# Patient Record
Sex: FEMALE | Race: WHITE | URBAN - METROPOLITAN AREA
[De-identification: names, ages, dates, MRNs, and addresses within clinical notes are randomized per-mention and may not be internally consistent; named-entity substitution may affect disease eponyms.]

---

## 2024-09-24 ENCOUNTER — APPOINTMENT (RX ONLY)
Dept: URBAN - METROPOLITAN AREA CLINIC 6 | Facility: CLINIC | Age: 74
Setting detail: DERMATOLOGY
End: 2024-09-24

## 2024-09-24 DIAGNOSIS — D18.0 HEMANGIOMA: ICD-10-CM

## 2024-09-24 DIAGNOSIS — D22 MELANOCYTIC NEVI: ICD-10-CM

## 2024-09-24 DIAGNOSIS — L82.1 OTHER SEBORRHEIC KERATOSIS: ICD-10-CM

## 2024-09-24 DIAGNOSIS — L81.4 OTHER MELANIN HYPERPIGMENTATION: ICD-10-CM

## 2024-09-24 DIAGNOSIS — Z71.89 OTHER SPECIFIED COUNSELING: ICD-10-CM

## 2024-09-24 PROBLEM — D18.01 HEMANGIOMA OF SKIN AND SUBCUTANEOUS TISSUE: Status: ACTIVE | Noted: 2024-09-24

## 2024-09-24 PROBLEM — D22.5 MELANOCYTIC NEVI OF TRUNK: Status: ACTIVE | Noted: 2024-09-24

## 2024-09-24 PROCEDURE — ? COUNSELING

## 2024-09-24 PROCEDURE — ? SUNSCREEN RECOMMENDATIONS

## 2024-09-24 PROCEDURE — 99203 OFFICE O/P NEW LOW 30 MIN: CPT

## 2024-09-24 ASSESSMENT — LOCATION SIMPLE DESCRIPTION DERM
LOCATION SIMPLE: CHEST
LOCATION SIMPLE: ABDOMEN
LOCATION SIMPLE: LEFT HAND
LOCATION SIMPLE: RIGHT BREAST
LOCATION SIMPLE: LEFT CHEEK
LOCATION SIMPLE: RIGHT HAND

## 2024-09-24 ASSESSMENT — LOCATION DETAILED DESCRIPTION DERM
LOCATION DETAILED: LEFT INFERIOR MEDIAL MALAR CHEEK
LOCATION DETAILED: LEFT ULNAR DORSAL HAND
LOCATION DETAILED: PERIUMBILICAL SKIN
LOCATION DETAILED: RIGHT MEDIAL BREAST 4-5:00 REGION
LOCATION DETAILED: RIGHT MEDIAL SUPERIOR CHEST
LOCATION DETAILED: RIGHT RADIAL DORSAL HAND
LOCATION DETAILED: EPIGASTRIC SKIN

## 2024-09-24 ASSESSMENT — LOCATION ZONE DERM
LOCATION ZONE: FACE
LOCATION ZONE: HAND
LOCATION ZONE: TRUNK

## 2025-01-20 ENCOUNTER — TELEPHONE (OUTPATIENT)
Dept: HEALTH INFORMATION MANAGEMENT | Facility: OTHER | Age: 75
End: 2025-01-20

## 2025-01-20 ENCOUNTER — OFFICE VISIT (OUTPATIENT)
Dept: MEDICAL GROUP | Facility: PHYSICIAN GROUP | Age: 75
End: 2025-01-20
Payer: MEDICARE

## 2025-01-20 VITALS
SYSTOLIC BLOOD PRESSURE: 108 MMHG | TEMPERATURE: 98.6 F | DIASTOLIC BLOOD PRESSURE: 62 MMHG | HEIGHT: 61 IN | HEART RATE: 98 BPM | OXYGEN SATURATION: 96 % | WEIGHT: 120.6 LBS | BODY MASS INDEX: 22.77 KG/M2

## 2025-01-20 DIAGNOSIS — E55.9 VITAMIN D DEFICIENCY: ICD-10-CM

## 2025-01-20 DIAGNOSIS — Z12.11 COLON CANCER SCREENING: ICD-10-CM

## 2025-01-20 DIAGNOSIS — E53.8 VITAMIN B12 DEFICIENCY: ICD-10-CM

## 2025-01-20 DIAGNOSIS — Z12.31 ENCOUNTER FOR SCREENING MAMMOGRAM FOR MALIGNANT NEOPLASM OF BREAST: ICD-10-CM

## 2025-01-20 DIAGNOSIS — Z11.59 NEED FOR HEPATITIS C SCREENING TEST: ICD-10-CM

## 2025-01-20 DIAGNOSIS — Z00.00 HEALTHCARE MAINTENANCE: ICD-10-CM

## 2025-01-20 DIAGNOSIS — Z23 NEED FOR VACCINATION: ICD-10-CM

## 2025-01-20 PROCEDURE — 3078F DIAST BP <80 MM HG: CPT | Performed by: FAMILY MEDICINE

## 2025-01-20 PROCEDURE — 99387 INIT PM E/M NEW PAT 65+ YRS: CPT | Mod: 25 | Performed by: FAMILY MEDICINE

## 2025-01-20 PROCEDURE — 90471 IMMUNIZATION ADMIN: CPT | Performed by: FAMILY MEDICINE

## 2025-01-20 PROCEDURE — 90715 TDAP VACCINE 7 YRS/> IM: CPT | Performed by: FAMILY MEDICINE

## 2025-01-20 PROCEDURE — 3074F SYST BP LT 130 MM HG: CPT | Performed by: FAMILY MEDICINE

## 2025-01-20 RX ORDER — AZELASTINE 1 MG/ML
1 SPRAY, METERED NASAL 2 TIMES DAILY
Qty: 30 ML | Refills: 0 | Status: SHIPPED | OUTPATIENT
Start: 2025-01-20

## 2025-01-20 RX ORDER — DIAPER,BRIEF,INFANT-TODD,DISP
EACH MISCELLANEOUS
COMMUNITY
Start: 2024-10-31

## 2025-01-20 RX ORDER — CETIRIZINE HYDROCHLORIDE 10 MG/1
10 TABLET ORAL DAILY
COMMUNITY

## 2025-01-20 ASSESSMENT — PATIENT HEALTH QUESTIONNAIRE - PHQ9: CLINICAL INTERPRETATION OF PHQ2 SCORE: 0

## 2025-01-20 NOTE — PROGRESS NOTES
Verbal consent was acquired by the patient to use [employer] ambient listening note generation during this visit.    Subjective:     HPI:   History of Present Illness  The patient is a 74-year-old female presenting for an initial consultation to establish care. She reports no significant past medical history and is not currently on any prescribed medications. She is due for routine annual screening, including hepatitis C screening, colorectal cancer screening, mammography, bone density testing, and vaccinations for tetanus, influenza, COVID-19, and herpes zoster, which will be discussed during today's visit.    The patient manages her allergic symptoms with cetirizine (Zyrtec), which provides partial relief. She has not had a primary care physician for an extended period but has been under the care of various specialists. She reports that fluticasone nasal spray (Flonase) was ineffective, while guaifenesin (Mucinex) taken at bedtime is beneficial. She has not utilized a neti pot or prescription nasal sprays. She experiences cephalalgia when unable to breathe through her right nostril. Her history includes sinus surgery with subsequent adhesion formation, which were surgically excised. She also has a small nasal polyp.    The patient has not had a recent mammogram and has discontinued bone density screenings due to her reluctance to initiate pharmacotherapy. She maintains her dental health with biannual check-ups and undergoes annual ophthalmologic examinations. 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 She has never undergone a colonoscopy and expresses disinterest in the procedure, having completed a Cologuard test several years ago. She has never received an influenza vaccine and has not contracted influenza or COVID-19 despite exposure. She reports not having had a cold in the past 8 years. She received the Prevnar  "13 and Pneumovax 23 vaccines several years ago, as well as the herpes zoster vaccine. She does not recall the last administration of a tetanus vaccine. She suspects hypercholesterolemia but has not had recent lipid panel testing. She has a family history of thyroid disorders but has not experienced thyroid dysfunction herself and has not had her thyroid function tested.    The patient reports decreased physical activity due to chronic plantar fasciitis, managed with stretching exercises and orthotic insoles, which provide some relief. She has not used night splints. Evaluation for bone spurs was negative, and she was provided with an elastic band for stretching exercises. She has also utilized a foam roller and tennis ball for symptomatic relief.    She reports chronic back pain and was recently diagnosed with lumbar arthritis, for which she is receiving physical therapy.    The patient describes poor sleep quality, frequently waking after a few hours of sleep. She denies snoring or symptoms suggestive of obstructive sleep apnea.    Supplemental Information  The patient takes daily vitamin D and B12 supplements and occasionally magnesium for muscle cramps. She previously took alendronate (Fosamax) but discontinued it due to urinary incontinence, which resolved upon cessation of the medication. She has noticed weight gain and is considering a weight reduction of 5 pounds.    FAMILY HISTORY  Her mother and sister had thyroid problems.    MEDICATIONS  Current: Zyrtec, vitamin D, vitamin B12, magnesium (occasionally)  Past: Fosamax    IMMUNIZATIONS  She received the Prevnar 13 and Pneumovax 23 vaccines several years ago, as well as the old shingles vaccine. She has received the COVID-19 vaccine.    Health Maintenance: Completed    Objective:     Exam:  /62 (BP Location: Right arm, Patient Position: Sitting, BP Cuff Size: Adult)   Pulse 98   Temp 37 °C (98.6 °F) (Temporal)   Ht 1.549 m (5' 1\")   Wt 54.7 kg (120 " lb 9.6 oz)   SpO2 96%   BMI 22.79 kg/m²  Body mass index is 22.79 kg/m².    Physical Exam  Vitals reviewed.   Constitutional:       Appearance: Normal appearance.   HENT:      Head: Normocephalic and atraumatic.      Right Ear: External ear normal.      Left Ear: External ear normal.      Nose: Nose normal.   Cardiovascular:      Rate and Rhythm: Normal rate and regular rhythm.      Pulses: Normal pulses.      Heart sounds: Normal heart sounds. No murmur heard.  Pulmonary:      Effort: Pulmonary effort is normal. No respiratory distress.      Breath sounds: Normal breath sounds. No wheezing.   Abdominal:      General: Abdomen is flat.      Palpations: Abdomen is soft.   Skin:     General: Skin is warm and dry.   Neurological:      Mental Status: She is alert and oriented to person, place, and time.   Psychiatric:         Mood and Affect: Mood normal.         Behavior: Behavior normal.             Results      Assessment & Plan:     1. Healthcare maintenance  CBC WITHOUT DIFFERENTIAL    TSH WITH REFLEX TO FT4    HEMOGLOBIN A1C    Lipid Profile    Comp Metabolic Panel      2. Need for hepatitis C screening test  HEP C VIRUS ANTIBODY      3. Encounter for screening mammogram for malignant neoplasm of breast  MA-SCREENING MAMMO BILAT W/TOMOSYNTHESIS W/CAD      4. Vitamin D deficiency  VITAMIN D,25 HYDROXY (DEFICIENCY)      5. Vitamin B12 deficiency  VITAMIN B12      6. Need for vaccination  Tdap Vaccine =>8YO IM      7. Colon cancer screening  Cologuard® colon cancer screening          Assessment & Plan  1. Allergic rhinitis.  She reports using Zyrtec with partial relief. A prescription for a nasal spray will be provided. She is advised to use a neti pot for saline rinses to help clear her nasal passages and reduce symptoms.    2. Health maintenance.  She is due for annual screening labs, including cholesterol, blood sugar over a 3-month period, thyroid function, complete blood count, kidney function, liver function,  electrolytes, and hep C screening. She is also due for a mammogram, colorectal cancer screening, and bone density test. She has a healthy BMI of 22 and her blood pressure is well-regulated. Her pulse rate is slightly elevated but within the normal range. Her oxygen saturation levels are satisfactory and her temperature is within the normal range. A comprehensive set of baseline labs will be ordered. A Cologuard test will be ordered for colorectal cancer screening. She will receive a tetanus shot today. She is advised to get the influenza vaccine annually, preferably in September or October, and the COVID-19 vaccine once a year. The Shingrix vaccine, a two-dose series, is recommended. She is advised to continue taking vitamin D at a dosage of 2000 international units daily and to engage in weight-bearing exercises. A CT coronary artery calcium score test is recommended if her cholesterol levels are high and she is hesitant to start statin therapy. Her thyroid levels will be checked to ensure they are within the normal range.    3. Plantar fasciitis.  She reports chronic plantar fasciitis, exacerbated by lack of stretching. She is advised to continue with stretching exercises and consider using a foam roller or tennis ball for additional relief. Night splints are recommended despite their inconvenience.    4. Lower back pain.  She reports significant arthritis in her lower lumbar region, confirmed by a recent MRI. She has been attending physical therapy and is advised to continue with the exercises provided by her therapist.    5. Insomnia.  She reports difficulty staying asleep. She is advised to maintain good sleep hygiene practices, such as avoiding screens before bedtime and keeping a consistent sleep schedule.    PROCEDURE  The patient underwent sinus surgery and subsequent adhesions were surgically removed.          Return in about 6 months (around 7/20/2025) for Annual/wellness visit.    Please note that this  dictation was created using voice recognition software. I have made every reasonable attempt to correct obvious errors, but I expect that there are errors of grammar and possibly content that I did not discover before finalizing the note.    Taryn ePrez MD  Family Medicine and Non - Operative Sports Medicine   [facility] [place]

## 2025-01-24 ENCOUNTER — HOSPITAL ENCOUNTER (OUTPATIENT)
Dept: RADIOLOGY | Facility: MEDICAL CENTER | Age: 75
End: 2025-01-24
Attending: FAMILY MEDICINE
Payer: MEDICARE

## 2025-01-24 DIAGNOSIS — Z12.31 ENCOUNTER FOR SCREENING MAMMOGRAM FOR MALIGNANT NEOPLASM OF BREAST: ICD-10-CM

## 2025-01-24 PROCEDURE — 77067 SCR MAMMO BI INCL CAD: CPT

## 2025-02-13 ENCOUNTER — HOSPITAL ENCOUNTER (OUTPATIENT)
Dept: LAB | Facility: MEDICAL CENTER | Age: 75
End: 2025-02-13
Attending: FAMILY MEDICINE
Payer: MEDICARE

## 2025-02-13 ENCOUNTER — RESULTS FOLLOW-UP (OUTPATIENT)
Dept: MEDICAL GROUP | Facility: PHYSICIAN GROUP | Age: 75
End: 2025-02-13

## 2025-02-13 DIAGNOSIS — Z00.00 HEALTHCARE MAINTENANCE: ICD-10-CM

## 2025-02-13 DIAGNOSIS — E53.8 VITAMIN B12 DEFICIENCY: ICD-10-CM

## 2025-02-13 DIAGNOSIS — Z11.59 NEED FOR HEPATITIS C SCREENING TEST: ICD-10-CM

## 2025-02-13 DIAGNOSIS — E55.9 VITAMIN D DEFICIENCY: ICD-10-CM

## 2025-02-13 LAB
25(OH)D3 SERPL-MCNC: 72 NG/ML (ref 30–100)
ALBUMIN SERPL BCP-MCNC: 4.5 G/DL (ref 3.2–4.9)
ALBUMIN/GLOB SERPL: 1.4 G/DL
ALP SERPL-CCNC: 103 U/L (ref 30–99)
ALT SERPL-CCNC: 17 U/L (ref 2–50)
ANION GAP SERPL CALC-SCNC: 12 MMOL/L (ref 7–16)
AST SERPL-CCNC: 21 U/L (ref 12–45)
BILIRUB SERPL-MCNC: 0.6 MG/DL (ref 0.1–1.5)
BUN SERPL-MCNC: 14 MG/DL (ref 8–22)
CALCIUM ALBUM COR SERPL-MCNC: 9.8 MG/DL (ref 8.5–10.5)
CALCIUM SERPL-MCNC: 10.2 MG/DL (ref 8.5–10.5)
CHLORIDE SERPL-SCNC: 104 MMOL/L (ref 96–112)
CHOLEST SERPL-MCNC: 249 MG/DL (ref 100–199)
CO2 SERPL-SCNC: 24 MMOL/L (ref 20–33)
CREAT SERPL-MCNC: 0.84 MG/DL (ref 0.5–1.4)
ERYTHROCYTE [DISTWIDTH] IN BLOOD BY AUTOMATED COUNT: 43.8 FL (ref 35.9–50)
EST. AVERAGE GLUCOSE BLD GHB EST-MCNC: 128 MG/DL
FASTING STATUS PATIENT QL REPORTED: NORMAL
GFR SERPLBLD CREATININE-BSD FMLA CKD-EPI: 73 ML/MIN/1.73 M 2
GLOBULIN SER CALC-MCNC: 3.3 G/DL (ref 1.9–3.5)
GLUCOSE SERPL-MCNC: 93 MG/DL (ref 65–99)
HBA1C MFR BLD: 6.1 % (ref 4–5.6)
HCT VFR BLD AUTO: 44.9 % (ref 37–47)
HCV AB SER QL: NORMAL
HDLC SERPL-MCNC: 59 MG/DL
HGB BLD-MCNC: 15.1 G/DL (ref 12–16)
LDLC SERPL CALC-MCNC: 149 MG/DL
MCH RBC QN AUTO: 31.3 PG (ref 27–33)
MCHC RBC AUTO-ENTMCNC: 33.6 G/DL (ref 32.2–35.5)
MCV RBC AUTO: 93 FL (ref 81.4–97.8)
PLATELET # BLD AUTO: 274 K/UL (ref 164–446)
PMV BLD AUTO: 10.7 FL (ref 9–12.9)
POTASSIUM SERPL-SCNC: 4.4 MMOL/L (ref 3.6–5.5)
PROT SERPL-MCNC: 7.8 G/DL (ref 6–8.2)
RBC # BLD AUTO: 4.83 M/UL (ref 4.2–5.4)
SODIUM SERPL-SCNC: 140 MMOL/L (ref 135–145)
TRIGL SERPL-MCNC: 207 MG/DL (ref 0–149)
TSH SERPL DL<=0.005 MIU/L-ACNC: 3.31 UIU/ML (ref 0.38–5.33)
VIT B12 SERPL-MCNC: 2485 PG/ML (ref 211–911)
WBC # BLD AUTO: 4.9 K/UL (ref 4.8–10.8)

## 2025-02-13 PROCEDURE — 36415 COLL VENOUS BLD VENIPUNCTURE: CPT

## 2025-02-13 PROCEDURE — 86803 HEPATITIS C AB TEST: CPT

## 2025-02-13 PROCEDURE — 82306 VITAMIN D 25 HYDROXY: CPT

## 2025-02-13 PROCEDURE — 82607 VITAMIN B-12: CPT

## 2025-02-13 PROCEDURE — 84443 ASSAY THYROID STIM HORMONE: CPT

## 2025-02-13 PROCEDURE — 85027 COMPLETE CBC AUTOMATED: CPT

## 2025-02-13 PROCEDURE — 83036 HEMOGLOBIN GLYCOSYLATED A1C: CPT

## 2025-02-13 PROCEDURE — 80053 COMPREHEN METABOLIC PANEL: CPT

## 2025-02-13 PROCEDURE — 80061 LIPID PANEL: CPT

## 2025-03-13 ENCOUNTER — TELEPHONE (OUTPATIENT)
Dept: SCHEDULING | Facility: IMAGING CENTER | Age: 75
End: 2025-03-13
Payer: MEDICARE

## 2025-03-17 ENCOUNTER — TELEPHONE (OUTPATIENT)
Dept: MEDICAL GROUP | Facility: PHYSICIAN GROUP | Age: 75
End: 2025-03-17
Payer: MEDICARE

## 2025-04-14 ENCOUNTER — OFFICE VISIT (OUTPATIENT)
Dept: MEDICAL GROUP | Facility: PHYSICIAN GROUP | Age: 75
End: 2025-04-14
Payer: MEDICARE

## 2025-04-14 VITALS
HEART RATE: 82 BPM | BODY MASS INDEX: 21.05 KG/M2 | HEIGHT: 61 IN | TEMPERATURE: 98.1 F | WEIGHT: 111.5 LBS | DIASTOLIC BLOOD PRESSURE: 78 MMHG | OXYGEN SATURATION: 94 % | SYSTOLIC BLOOD PRESSURE: 122 MMHG

## 2025-04-14 DIAGNOSIS — R79.89 ELEVATED VITAMIN B12 LEVEL: ICD-10-CM

## 2025-04-14 DIAGNOSIS — R63.4 WEIGHT LOSS: ICD-10-CM

## 2025-04-14 DIAGNOSIS — E78.2 MIXED HYPERLIPIDEMIA: ICD-10-CM

## 2025-04-14 DIAGNOSIS — R73.03 PREDIABETES: ICD-10-CM

## 2025-04-14 PROCEDURE — 3074F SYST BP LT 130 MM HG: CPT | Performed by: FAMILY MEDICINE

## 2025-04-14 PROCEDURE — 99214 OFFICE O/P EST MOD 30 MIN: CPT | Performed by: FAMILY MEDICINE

## 2025-04-14 PROCEDURE — 3078F DIAST BP <80 MM HG: CPT | Performed by: FAMILY MEDICINE

## 2025-04-14 ASSESSMENT — FIBROSIS 4 INDEX: FIB4 SCORE: 1.38

## 2025-04-14 NOTE — PROGRESS NOTES
Verbal consent was acquired by the patient to use [employer] ambient listening note generation during this visit.    Subjective:     HPI:   History of Present Illness  The patient presents for evaluation of weight loss, hypercholesterolemia, and prediabetes.    She reports a weight loss of 9 pounds over the past 3 months, attributed to dietary modifications including the elimination of sugar and meat from her diet. She has also ceased her morning cereal intake and baking activities. Her diet is predominantly plant-based, with occasional consumption of beef and broccoli. She consumes fruits in moderation due to a history of labial eruptions and gastroesophageal reflux. Her current diet includes 0% yogurt with blueberries and honey. She enjoys salmon but prefers to consume it outside the home due to an aversion to cooking odors. She has not experienced any illness or upper respiratory infections in over 8 years. She has discontinued her vitamin B12 supplements and is currently taking vitamin D at a dosage of 5000 international units daily.    She recently underwent a comprehensive ophthalmologic examination, including a 3D retinal scan, which yielded normal results. She abstains from fast food and junk food. She is uncertain about her family history of hypercholesterolemia as her parents were never tested. She expresses concern about potential adverse effects of medication and has never taken statins. She is curious about the impact of dietary changes on her cholesterol levels.    She has made significant dietary changes, including the elimination of sugar and meat, and has not consumed Ensure protein drinks. She has attempted to replace her morning cereal with healthier alternatives but found them unpalatable. She consumes lactose-free milk and occasionally drinks tea and black coffee. She recently diluted grape juice with water for consumption.    Supplemental Information  She reports that her plantar fasciitis has  "not been causing significant discomfort, allowing her to engage in prolonged ambulation. She maintains an active lifestyle, although she notes a decrease in activity since the passing of her . 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 She has arthritis in the lumbar region, which she attributes to scoliosis.    FAMILY HISTORY  She is unsure if her parents ever had high cholesterol as they were never tested.  Her grandfather lived to be 96, her aunt 92, and her other uncle 90. All her other uncles and aunts lived into their 80s despite having poor diets.    MEDICATIONS  Current: Vitamin D  Discontinued: B12 supplements  The 10-year ASCVD risk score (Hortencia LOCKE, et al., 2019) is: 13.6%    Health Maintenance: Completed    Objective:     Exam:  /78 (BP Location: Right arm, Patient Position: Sitting, BP Cuff Size: Adult)   Pulse 82   Temp 36.7 °C (98.1 °F) (Temporal)   Ht 1.549 m (5' 1\")   Wt 50.6 kg (111 lb 8 oz)   SpO2 94%   BMI 21.07 kg/m²  Body mass index is 21.07 kg/m².    Physical Exam  Vitals reviewed.   Constitutional:       Appearance: Normal appearance.   HENT:      Head: Normocephalic and atraumatic.      Right Ear: External ear normal.      Left Ear: External ear normal.      Nose: Nose normal.   Cardiovascular:      Rate and Rhythm: Normal rate and regular rhythm.      Pulses: Normal pulses.      Heart sounds: Normal heart sounds. No murmur heard.  Pulmonary:      Effort: Pulmonary effort is normal. No respiratory distress.      Breath sounds: Normal breath sounds. No wheezing.   Abdominal:      General: Abdomen is flat.      Palpations: Abdomen is soft.   Skin:     General: Skin is warm and dry.   Neurological:      Mental Status: She is alert and oriented to person, place, and time.   Psychiatric:         Mood and Affect: Mood normal.         Behavior: Behavior normal.             Results  Laboratory Studies  LDL cholesterol was 150. Total cholesterol was " 250. A1c was 6.1. Vitamin D level is normal.    Assessment & Plan:     1. Mixed hyperlipidemia  CT-CARDIAC SCORING      2. Prediabetes        3. Elevated vitamin B12 level        4. Weight loss            Assessment & Plan  1. Weight loss.  She has experienced a weight loss of 9 pounds over the past 3 months, which is not attributable to thyroid function as it remains within normal limits. She has been advised to incorporate more protein into her diet, specifically low-fat, high-protein sources such as white meat. The use of Ensure plant-based protein nutrition shakes has been recommended, with a daily intake of one shake. Her weight will be closely monitored during her next visit in July.    2. Hypercholesterolemia.  Her LDL cholesterol level is elevated at 150 mg/dL, while her total cholesterol level is 250 mg/dL. Given these levels, she is at a 13% risk for stroke and heart attack over the next decade. A CT scan without contrast of the cardiac vessels has been ordered to assess for plaque buildup. Medication initiation will be deferred until the results of the CT scan are available.    3. Prediabetes.  Her A1c level is currently at 6.1%, indicating prediabetes. She has made significant dietary changes, including reducing sugar intake and cutting out cereal. The use of Glucerna shakes has been recommended as a protein source with low sugar content. Her A1c level will be rechecked during her next visit in July.    Follow-up  The patient is scheduled for a follow-up visit in July.      Return in about 3 months (around 7/14/2025) for F/u labs.    Please note that this dictation was created using voice recognition software. I have made every reasonable attempt to correct obvious errors, but I expect that there are errors of grammar and possibly content that I did not discover before finalizing the note.    Taryn Perez MD  Family Medicine and Non - Operative Sports Medicine   [facility] [place] [facility]

## 2025-04-22 ENCOUNTER — TELEPHONE (OUTPATIENT)
Dept: MEDICAL GROUP | Facility: PHYSICIAN GROUP | Age: 75
End: 2025-04-22
Payer: MEDICARE

## 2025-04-23 DIAGNOSIS — E78.2 MIXED HYPERLIPIDEMIA: ICD-10-CM

## 2025-04-23 RX ORDER — ROSUVASTATIN CALCIUM 10 MG/1
10 TABLET, COATED ORAL EVERY EVENING
Qty: 100 TABLET | Refills: 3 | Status: SHIPPED | OUTPATIENT
Start: 2025-04-23 | End: 2026-05-28

## 2025-04-23 NOTE — TELEPHONE ENCOUNTER
VOICEMAIL  1. Caller Name: Lizabeth Madison Case                        Call Back Number: [phone]     2. Message: DOESN'T WANT TO DO THE CT BUT WOULD LIKE YOU TO CALL IN THE STATIN     3. Patient approves office to leave a detailed voicemail/MyChart message: N\A

## 2025-05-01 ENCOUNTER — TELEPHONE (OUTPATIENT)
Dept: MEDICAL GROUP | Facility: MEDICAL CENTER | Age: 75
End: 2025-05-01
Payer: MEDICARE

## 2025-05-01 NOTE — TELEPHONE ENCOUNTER
Message: Called and left message for patient to schedule annual Comprehensive Health Assessment visit with the [facility]. Left phone number for patient to call [employer] at [phone] to schedule.

## 2025-07-21 ENCOUNTER — OFFICE VISIT (OUTPATIENT)
Dept: MEDICAL GROUP | Facility: PHYSICIAN GROUP | Age: 75
End: 2025-07-21
Payer: MEDICARE

## 2025-07-21 ENCOUNTER — APPOINTMENT (OUTPATIENT)
Dept: LAB | Facility: MEDICAL CENTER | Age: 75
End: 2025-07-21
Payer: MEDICARE

## 2025-07-21 VITALS
HEART RATE: 75 BPM | RESPIRATION RATE: 14 BRPM | SYSTOLIC BLOOD PRESSURE: 116 MMHG | OXYGEN SATURATION: 94 % | TEMPERATURE: 99.7 F | BODY MASS INDEX: 20.01 KG/M2 | WEIGHT: 106 LBS | HEIGHT: 61 IN | DIASTOLIC BLOOD PRESSURE: 68 MMHG

## 2025-07-21 DIAGNOSIS — E78.2 MIXED HYPERLIPIDEMIA: ICD-10-CM

## 2025-07-21 DIAGNOSIS — M51.360 DEGENERATION OF INTERVERTEBRAL DISC OF LUMBAR REGION WITH DISCOGENIC BACK PAIN: ICD-10-CM

## 2025-07-21 DIAGNOSIS — R73.03 PREDIABETES: Primary | ICD-10-CM

## 2025-07-21 DIAGNOSIS — R63.4 WEIGHT LOSS: ICD-10-CM

## 2025-07-21 PROCEDURE — 99214 OFFICE O/P EST MOD 30 MIN: CPT | Performed by: FAMILY MEDICINE

## 2025-07-21 PROCEDURE — 3078F DIAST BP <80 MM HG: CPT | Performed by: FAMILY MEDICINE

## 2025-07-21 PROCEDURE — 3074F SYST BP LT 130 MM HG: CPT | Performed by: FAMILY MEDICINE

## 2025-07-21 ASSESSMENT — FIBROSIS 4 INDEX: FIB4 SCORE: 1.38

## 2025-07-21 NOTE — PROGRESS NOTES
Verbal consent was acquired by the patient to use [employer] ambient listening note generation during this visit.    Subjective:     HPI:   History of Present Illness  The patient presents for a routine examination.    She reports experiencing presyncope and vertigo following prolonged sun exposure during a swimming relay event, despite utilizing an umbrella and sunscreen. She has been implementing dietary modifications, specifically reducing her intake of carbohydrates and sugars. Her diet occasionally includes bars containing 8 grams of fat and 14 grams of sugar, with no added sugars. Her nutritional intake consists of avocados, peanut butter, bananas, organic yogurt, salmon fillets, chicken breast, and lettuce wraps, while she avoids fried foods and beef, except for occasional consumption of hamburgers. She believes she has been maintaining her weight. For her back, she engages in stretching exercises and walking. She has not commenced aquatic therapy due to concerns about potential weight loss.    The patient is scheduled to receive an intra-articular injection for arthritis at [facility], [place], operated by [facility]. Additionally, she is due for another radiofrequency ablation (RFA) in late September 2025, having undergone the procedure last year, which provided temporary relief. However, she has been experiencing recurrent symptoms recently.    She has deferred a CT coronary artery scan due to concerns about the potential carcinogenic risks associated with radiation exposure.    She has not yet completed her colorectal cancer screening.    Social History:  Diet: The patient consumes avocados, peanut butter, bananas, organic yogurt, salmon fillets, chicken breast, and lettuce wraps. She avoids fried foods and beef, except for occasional consumption of hamburgers.    PAST SURGICAL HISTORY:  Radiofrequency ablation (RFA) - 2024    FAMILY HISTORY  Her aunt had colon cancer and her colon burst after a colonoscopy.    Health  "Maintenance: Completed    Objective:     Exam:  /68 (BP Location: Left arm, Patient Position: Sitting, BP Cuff Size: Adult)   Pulse 75   Temp 37.6 °C (99.7 °F) (Temporal)   Resp 14   Ht 1.549 m (5' 1\")   Wt 48.1 kg (106 lb)   SpO2 94%   BMI 20.03 kg/m²  Body mass index is 20.03 kg/m².    Physical Exam  Vitals reviewed.   Constitutional:       Appearance: Normal appearance.   HENT:      Head: Normocephalic and atraumatic.      Right Ear: External ear normal.      Left Ear: External ear normal.      Nose: Nose normal. No congestion or rhinorrhea.      Mouth/Throat:      Mouth: Mucous membranes are moist.   Eyes:      Extraocular Movements: Extraocular movements intact.      Pupils: Pupils are equal, round, and reactive to light.   Cardiovascular:      Rate and Rhythm: Normal rate and regular rhythm.      Pulses: Normal pulses.      Heart sounds: Normal heart sounds. No murmur heard.  Pulmonary:      Effort: Pulmonary effort is normal. No respiratory distress.      Breath sounds: Normal breath sounds. No wheezing.   Abdominal:      General: Abdomen is flat. Bowel sounds are normal. There is no distension.      Palpations: Abdomen is soft.      Tenderness: There is no abdominal tenderness.   Musculoskeletal:      Cervical back: Neck supple.   Skin:     General: Skin is warm and dry.      Capillary Refill: Capillary refill takes less than 2 seconds.   Neurological:      General: No focal deficit present.      Mental Status: She is alert and oriented to person, place, and time.   Psychiatric:         Mood and Affect: Mood normal.         Behavior: Behavior normal.             Results  Labs   - A1c: 6.1    Assessment & Plan:     1. Prediabetes        2. Weight loss        3. Mixed hyperlipidemia        4. Degeneration of intervertebral disc of lumbar region with discogenic back pain            Assessment & Plan  1. Prediabetes.  - Last A1c was 6.1, indicating a prediabetic state.  - Dietary modifications " include reducing carbohydrates and sugars.  - Advised to continue dietary changes and consider consuming only half of the protein bars due to their sugar content.  - Follow-up A1c test has been ordered to monitor blood sugar levels.    2. Hyperlipidemia.  - Currently on rosuvastatin, expected to improve cholesterol levels.  - Advised to continue current medication regimen and dietary practices, including consuming avocados, peanut butter, bananas, and occasional chicken and fish.  - Repeat cholesterol test has been ordered to monitor lipid profile.    3. DDD, lumbar  - Receiving injections for back pain and prescribed aquatic therapy, which has not yet started.  - Advised to continue stretching and walking exercises and consider starting aquatic therapy to help with back pain.    4. Health maintenance.  - Weight has decreased by 5 pounds since the last visit, but maintaining weight well.  - BMI is within the healthy range at 20.  - Advised to continue current dietary and exercise regimen.  - Cologuard test has been ordered for colon cancer screening, to be completed before 01/2026.    Follow-up: A follow-up visit is scheduled in 3 to 4 months.          Return in about 4 months (around 11/21/2025) for F/u labs.    Please note that this dictation was created using voice recognition software. I have made every reasonable attempt to correct obvious errors, but I expect that there are errors of grammar and possibly content that I did not discover before finalizing the note.    Taryn Perez MD  Family Medicine and Non - Operative Sports Medicine   [facility] [place] [facility]

## 2025-08-15 ENCOUNTER — HOSPITAL ENCOUNTER (OUTPATIENT)
Dept: LAB | Facility: MEDICAL CENTER | Age: 75
End: 2025-08-15
Attending: FAMILY MEDICINE
Payer: MEDICARE

## 2025-08-15 DIAGNOSIS — R73.03 PREDIABETES: ICD-10-CM

## 2025-08-15 DIAGNOSIS — E78.2 MIXED HYPERLIPIDEMIA: ICD-10-CM

## 2025-08-15 LAB
CHOLEST SERPL-MCNC: 141 MG/DL (ref 100–199)
EST. AVERAGE GLUCOSE BLD GHB EST-MCNC: 111 MG/DL
FASTING STATUS PATIENT QL REPORTED: NORMAL
HBA1C MFR BLD: 5.5 % (ref 4–5.6)
HDLC SERPL-MCNC: 62 MG/DL
LDLC SERPL CALC-MCNC: 56 MG/DL
TRIGL SERPL-MCNC: 115 MG/DL (ref 0–149)

## 2025-08-15 PROCEDURE — 80061 LIPID PANEL: CPT

## 2025-08-15 PROCEDURE — 83036 HEMOGLOBIN GLYCOSYLATED A1C: CPT

## 2025-08-15 PROCEDURE — 36415 COLL VENOUS BLD VENIPUNCTURE: CPT
